# Patient Record
Sex: MALE | Race: WHITE
[De-identification: names, ages, dates, MRNs, and addresses within clinical notes are randomized per-mention and may not be internally consistent; named-entity substitution may affect disease eponyms.]

---

## 2021-12-07 ENCOUNTER — HOSPITAL ENCOUNTER (EMERGENCY)
Dept: HOSPITAL 47 - EC | Age: 66
Discharge: HOME | End: 2021-12-07
Payer: MEDICARE

## 2021-12-07 VITALS — SYSTOLIC BLOOD PRESSURE: 159 MMHG | HEART RATE: 120 BPM | DIASTOLIC BLOOD PRESSURE: 90 MMHG

## 2021-12-07 VITALS — RESPIRATION RATE: 18 BRPM

## 2021-12-07 VITALS — TEMPERATURE: 98.7 F

## 2021-12-07 DIAGNOSIS — R04.0: Primary | ICD-10-CM

## 2021-12-07 DIAGNOSIS — I10: ICD-10-CM

## 2021-12-07 DIAGNOSIS — R00.0: ICD-10-CM

## 2021-12-07 LAB
ALBUMIN SERPL-MCNC: 4.6 G/DL (ref 3.5–5)
ALP SERPL-CCNC: 112 U/L (ref 38–126)
ALT SERPL-CCNC: 18 U/L (ref 4–49)
ANION GAP SERPL CALC-SCNC: 13 MMOL/L
AST SERPL-CCNC: 29 U/L (ref 17–59)
BASOPHILS # BLD AUTO: 0 K/UL (ref 0–0.2)
BASOPHILS NFR BLD AUTO: 0 %
BUN SERPL-SCNC: 14 MG/DL (ref 9–20)
CALCIUM SPEC-MCNC: 9.2 MG/DL (ref 8.4–10.2)
CHLORIDE SERPL-SCNC: 104 MMOL/L (ref 98–107)
CO2 SERPL-SCNC: 20 MMOL/L (ref 22–30)
EOSINOPHIL # BLD AUTO: 0.1 K/UL (ref 0–0.7)
EOSINOPHIL NFR BLD AUTO: 1 %
ERYTHROCYTE [DISTWIDTH] IN BLOOD BY AUTOMATED COUNT: 5.22 M/UL (ref 4.3–5.9)
ERYTHROCYTE [DISTWIDTH] IN BLOOD: 12.7 % (ref 11.5–15.5)
GLUCOSE SERPL-MCNC: 247 MG/DL (ref 74–99)
GLUCOSE UR QL: (no result)
HCT VFR BLD AUTO: 49 % (ref 39–53)
HGB BLD-MCNC: 16.2 GM/DL (ref 13–17.5)
KETONES UR QL STRIP.AUTO: (no result)
LYMPHOCYTES # SPEC AUTO: 1.1 K/UL (ref 1–4.8)
LYMPHOCYTES NFR SPEC AUTO: 9 %
MAGNESIUM SPEC-SCNC: 1.9 MG/DL (ref 1.6–2.3)
MCH RBC QN AUTO: 31 PG (ref 25–35)
MCHC RBC AUTO-ENTMCNC: 33 G/DL (ref 31–37)
MCV RBC AUTO: 94 FL (ref 80–100)
MONOCYTES # BLD AUTO: 0.6 K/UL (ref 0–1)
MONOCYTES NFR BLD AUTO: 5 %
NEUTROPHILS # BLD AUTO: 9.8 K/UL (ref 1.3–7.7)
NEUTROPHILS NFR BLD AUTO: 83 %
PH UR: 5 [PH] (ref 5–8)
PLATELET # BLD AUTO: 176 K/UL (ref 150–450)
POTASSIUM SERPL-SCNC: 5 MMOL/L (ref 3.5–5.1)
PROT SERPL-MCNC: 8 G/DL (ref 6.3–8.2)
SODIUM SERPL-SCNC: 137 MMOL/L (ref 137–145)
SP GR UR: 1.03 (ref 1–1.03)
UROBILINOGEN UR QL STRIP: <2 MG/DL (ref ?–2)
WBC # BLD AUTO: 11.7 K/UL (ref 3.8–10.6)

## 2021-12-07 PROCEDURE — 96376 TX/PRO/DX INJ SAME DRUG ADON: CPT

## 2021-12-07 PROCEDURE — 81003 URINALYSIS AUTO W/O SCOPE: CPT

## 2021-12-07 PROCEDURE — 83735 ASSAY OF MAGNESIUM: CPT

## 2021-12-07 PROCEDURE — 96374 THER/PROPH/DIAG INJ IV PUSH: CPT

## 2021-12-07 PROCEDURE — 96375 TX/PRO/DX INJ NEW DRUG ADDON: CPT

## 2021-12-07 PROCEDURE — 85025 COMPLETE CBC W/AUTO DIFF WBC: CPT

## 2021-12-07 PROCEDURE — 71046 X-RAY EXAM CHEST 2 VIEWS: CPT

## 2021-12-07 PROCEDURE — 99285 EMERGENCY DEPT VISIT HI MDM: CPT

## 2021-12-07 PROCEDURE — 36415 COLL VENOUS BLD VENIPUNCTURE: CPT

## 2021-12-07 PROCEDURE — 80053 COMPREHEN METABOLIC PANEL: CPT

## 2021-12-07 PROCEDURE — 93005 ELECTROCARDIOGRAM TRACING: CPT

## 2021-12-07 NOTE — ED
General Adult HPI





- General


Source: EMS, RN notes reviewed


Mode of arrival: EMS


Limitations: no limitations





<Angel Chambers - Last Filed: 12/07/21 16:24>





<Rodrigo Harris - Last Filed: 12/07/21 21:40>





- General


Chief complaint: ENT


Stated complaint: Nose bleed/high BP


Time Seen by Provider: 12/07/21 15:30





- History of Present Illness


Initial comments: 





66-year-old male without any medical history presents to the emergency room for 

a chief complaint of nosebleeds.  Patient states he has had nosebleeds intermi

ttently for years.  States he was told by Dr. Coy to use Afrin when they 

start.  For the past 4 days patient has been using 3 sprays in each nostril 3 

times a day.  Patient presented today for nosebleed as well.  Patient's 

nosebleed did resolve prior to arrival.  Patient denies any other compla

ints.Patient has no other complaints at this time including shortness of breath,

chest pain, abdominal pain, nausea or vomiting, headache, or visual changes. 

(Angel Chambers)





- Related Data


                                  Previous Rx's











 Medication  Instructions  Recorded


 


Sodium Chloride [Little Remedies 1 spray EA NOSTRIL BID #30 ml 12/07/21





Saline Spray]  


 


amLODIPine [Norvasc] 2.5 mg PO DAILY 30 Days #30 tablet 12/07/21











                                    Allergies











Allergy/AdvReac Type Severity Reaction Status Date / Time


 


No Known Allergies Allergy   Verified 12/07/21 14:41














Review of Systems


ROS Other: All systems not noted in ROS Statement are negative.





<Angel Chambers - Last Filed: 12/07/21 16:24>


ROS Other: All systems not noted in ROS Statement are negative.





<Rodrigo Harris - Last Filed: 12/07/21 21:40>


ROS Statement: 


Those systems with pertinent positive or pertinent negative responses have been 

documented in the HPI.








Past Medical History


Past Medical History: No Reported History


History of Any Multi-Drug Resistant Organisms: None Reported


Past Surgical History: No Surgical Hx Reported


Past Psychological History: No Psychological Hx Reported


Smoking Status: Never smoker


Past Alcohol Use History: None Reported


Past Drug Use History: None Reported





<Angel Chambers - Last Filed: 12/07/21 16:24>





General Exam


Limitations: no limitations


General appearance: alert, in no apparent distress


Head exam: Present: atraumatic


Eye exam: Present: normal appearance, PERRL, EOMI.  Absent: scleral icterus, 

conjunctival injection


ENT exam: Present: normal exam, mucous membranes moist


Neck exam: Present: normal inspection, full ROM.  Absent: tenderness


Respiratory exam: Present: normal lung sounds bilaterally.  Absent: respiratory 

distress, wheezes


Cardiovascular Exam: Present: regular rate, normal rhythm, normal heart sounds


GI/Abdominal exam: Present: soft, normal bowel sounds.  Absent: distended, 

tenderness


Neurological exam: Present: alert





<Angel Chambers - Last Filed: 12/07/21 16:24>





Course


                                   Vital Signs











  12/07/21 12/07/21 12/07/21





  14:36 15:29 17:23


 


Temperature  98.7 F 


 


Pulse Rate 124 H 122 H 118 H


 


Respiratory 18 18 18





Rate   


 


Blood Pressure 215/113 173/96 178/95


 


O2 Sat by Pulse 98 99 98





Oximetry   














  12/07/21 12/07/21





  17:41 18:04


 


Temperature  


 


Pulse Rate 125 H 120 H


 


Respiratory 18 18





Rate  


 


Blood Pressure 199/102 159/90


 


O2 Sat by Pulse 98 98





Oximetry  














EKG Findings





- EKG Comments:


EKG Findings:: Sinus tachycardia, ventricular rate 123, CT interval 156, QTc 478





<Angel Chambers - Last Filed: 12/07/21 16:24>





Medical Decision Making





<Angel Chambers - Last Filed: 12/07/21 16:24>





- Lab Data


Result diagrams: 


                                 12/07/21 17:00





                                 12/07/21 16:10





<Rodrigo Harris - Last Filed: 12/07/21 21:40>





- Medical Decision Making





She presents hypertensive and tachycardic.  Patient does not have a history of 

hypertension but also has not seen a primary care doctor in 20 years.  

Hypertension could be related to excessive use of Afrin.  EKG shows a sinus 

tachycardia with a ventricular rate of 123. (Angel Chambers)


Patient was signed out to me by mid-level provider.  Pending reevaluation of 

blood pressure.  Patient came in for acute nosebleed as well as asymptomatic 

hypertension otherwise.  Is also found to be mildly tachycardic.  States he is 

extremely nervous.  States she is not on any medications.  Basic labs were 

obtained and were unremarkable.  Patient is not anemic.  Does have 4+ glucose in

 urine but no signs of DKA or anything else on labs.  He is otherwise asym

ptomatic and denies any wrist pain, shortness of breath, headaches, numbness, 

weakness, lightheadedness.  His no belly pain, nausea, vomiting, diarrhea.  He 

would like to go home.  Repeat blood pressure is improved to 159/90.  Heart rate

 is also mildly improved.  He states it has been high previously.  He reiterates

 that he would like to return home.  I believe this is reasonable.  I do want 

her to follow-up with ENT which she was in agreement with.  This bleeding is 

stopped at this time.  I will advise him to use saline nose spray at home.  I 

will also provide him with a low dose antihypertensive medication to take at 

home until he can follow-up with his PCP in the upcoming weeks.  He was in 

agreement with this plan.  EKG was obtained and showed normal sinus tachycardia 

without any signs of acute ischemia.  He remains symptomatic at this time.





Strict return precautions were provided to the patient.





I will provide the patient with a prescription for saline nose spray, amlodipine

 2.5 mg daily. I instructed the patient to follow up with their PCP in the next 

3 days.  I explained that the patient should return to the emergency department 

if they experience any worsening symptoms. Strict return precautions were 

discussed with the patient. The patient expressed understanding of these 

instructions. I answered all questions that the patient had. The patient was 

discharged home in good condition with their prescriptions and follow up 

information. (Rodrigo Harris)





- Lab Data


                                   Lab Results











  12/07/21 12/07/21 12/07/21 Range/Units





  16:10 17:00 17:33 


 


WBC   11.7 H   (3.8-10.6)  k/uL


 


RBC   5.22   (4.30-5.90)  m/uL


 


Hgb   16.2   (13.0-17.5)  gm/dL


 


Hct   49.0   (39.0-53.0)  %


 


MCV   94.0   (80.0-100.0)  fL


 


MCH   31.0   (25.0-35.0)  pg


 


MCHC   33.0   (31.0-37.0)  g/dL


 


RDW   12.7   (11.5-15.5)  %


 


Plt Count   176   (150-450)  k/uL


 


MPV   9.3   


 


Neutrophils %   83   %


 


Lymphocytes %   9   %


 


Monocytes %   5   %


 


Eosinophils %   1   %


 


Basophils %   0   %


 


Neutrophils #   9.8 H   (1.3-7.7)  k/uL


 


Lymphocytes #   1.1   (1.0-4.8)  k/uL


 


Monocytes #   0.6   (0-1.0)  k/uL


 


Eosinophils #   0.1   (0-0.7)  k/uL


 


Basophils #   0.0   (0-0.2)  k/uL


 


Sodium  137    (137-145)  mmol/L


 


Potassium  5.0    (3.5-5.1)  mmol/L


 


Chloride  104    ()  mmol/L


 


Carbon Dioxide  20 L    (22-30)  mmol/L


 


Anion Gap  13    mmol/L


 


BUN  14    (9-20)  mg/dL


 


Creatinine  0.50 L    (0.66-1.25)  mg/dL


 


Est GFR (CKD-EPI)AfAm  >90    (>60 ml/min/1.73 sqM)  


 


Est GFR (CKD-EPI)NonAf  >90    (>60 ml/min/1.73 sqM)  


 


Glucose  247 H    (74-99)  mg/dL


 


Calcium  9.2    (8.4-10.2)  mg/dL


 


Magnesium  1.9    (1.6-2.3)  mg/dL


 


Total Bilirubin  0.8    (0.2-1.3)  mg/dL


 


AST  29    (17-59)  U/L


 


ALT  18    (4-49)  U/L


 


Alkaline Phosphatase  112    ()  U/L


 


Total Protein  8.0    (6.3-8.2)  g/dL


 


Albumin  4.6    (3.5-5.0)  g/dL


 


Urine Color    Yellow  


 


Urine Appearance    Clear  (Clear)  


 


Urine pH    5.0  (5.0-8.0)  


 


Ur Specific Gravity    1.032  (1.001-1.035)  


 


Urine Protein    Negative  (Negative)  


 


Urine Glucose (UA)    4+ H  (Negative)  


 


Urine Ketones    2+ H  (Negative)  


 


Urine Blood    Negative  (Negative)  


 


Urine Nitrite    Negative  (Negative)  


 


Urine Bilirubin    Negative  (Negative)  


 


Urine Urobilinogen    <2.0  (<2.0)  mg/dL


 


Ur Leukocyte Esterase    Negative  (Negative)  














Disposition





<Angel Chambers - Last Filed: 12/07/21 16:24>


Is patient prescribed a controlled substance at d/c from ED?: No





<Rodrigo Harris - Last Filed: 12/07/21 21:40>


Clinical Impression: 


 Bleeding nose, Asymptomatic hypertension, Tachycardia





Disposition: HOME SELF-CARE


Condition: Fair


Instructions (If sedation given, give patient instructions):  Nosebleed (ED), 

Hypertension (ED)


Prescriptions: 


Sodium Chloride [Little Remedies Saline Spray] 1 spray EA NOSTRIL BID #30 ml


amLODIPine [Norvasc] 2.5 mg PO DAILY 30 Days #30 tablet


Referrals: 


Ashley Burton MD [Primary Care Provider] - 1-2 days

## 2021-12-07 NOTE — XR
EXAMINATION TYPE: XR chest 2V

 

DATE OF EXAM: 12/7/2021

 

COMPARISON: NONE

 

HISTORY: Tachycardia

 

TECHNIQUE: 2 views

 

FINDINGS: Heart and mediastinum are normal. Lungs are clear. Diaphragm is normal. Bony thorax is inta
ct.

 

IMPRESSION: Normal chest.

## 2022-08-12 ENCOUNTER — HOSPITAL ENCOUNTER (OUTPATIENT)
Dept: HOSPITAL 47 - RADUSWWP | Age: 67
Discharge: HOME | End: 2022-08-12
Attending: INTERNAL MEDICINE
Payer: MEDICARE

## 2022-08-12 DIAGNOSIS — E11.65: Primary | ICD-10-CM

## 2022-08-12 DIAGNOSIS — R42: ICD-10-CM

## 2022-08-12 PROCEDURE — 93880 EXTRACRANIAL BILAT STUDY: CPT

## 2022-08-12 NOTE — US
EXAMINATION TYPE: US carotid duplex BILAT

 

DATE OF EXAM: 8/12/2022

 

COMPARISON: NONE

 

CLINICAL HISTORY: E11.65 TYPE 2 DIABETES MELLITUS WITH HYPERGLYCEMIA. Dizzyness

 

TECHNIQUE: Carotid duplex ultrasound examination. Indirect Doppler criteria was utilized.

 

FINDINGS:

 

EXAM MEASUREMENTS: 

 

RIGHT:  Peak Systolic Velocity (PSV) cm/sec

----- Right CCA:  76.4  

----- Right ICA:  85.2     

----- Right ECA:  117.1   

ICA/CCA ratio:  1.1    

 

RIGHT:  End Diastole cm/sec

----- Right CCA:  16.9   

----- Right ICA:  21.2      

----- Right ECA:  0     

 

LEFT:  Peak Systolic Velocity (PSV) cm/sec

----- Left CCA:  76.4  

----- Left ICA:  89.5   

----- Left ECA:  88.1  

ICA/CCA ratio:  1.2  

 

LEFT:  End Diastole cm/sec

----- Left CCA:  25.3  

----- Left ICA:  22.7   

----- Left ECA:  8.1 

 

VERTEBRALS (direction of flow):

Right Vertebral: Antegrade

Left Vertebral: Antegrade

 

Rhythm:  Normal

 

SONOGRAPHER NOTES: No significant stenosis seen

 

 

 

IMPRESSION:  

No evidence for hemodynamically significant stenosis.   

 

 

 

 

 

 

Criteria for Assigning % of Stenosis / Diameter reduction

(Estimation based on the indirect measurements of the internal carotid artery velocities (ICA PSV).

1.  Normal (no stenosis)=ICA PSV < 125 cm/s: ratio < 2.0: ICA EDV<40 cm/s.

2. Less than 50% stenosis=ICA PSV < 125 cm/s: ratio < 2.0: ICA EDV<40 cm/s.

3.  50 to 69% stenosis=ICA PSV of 125 to 230 cm/s: ration 2.0 ? 4.0: ICA EDV  cm/s.

4.  Greater than 70% stenosis to near occlusion= ICA PSV > 230 cm/s: ratio > 4.0: ICA EDV > 100 cm/s.
 

5.  Near occlusion= ICA PSV velocities may be low or undetectable: variable ratio and ICA EDV.

6.  Total occlusion=unable to detect flow.